# Patient Record
(demographics unavailable — no encounter records)

---

## 2024-11-01 NOTE — HISTORY OF PRESENT ILLNESS
[Cystocele (Obstetric)] : no [Vaginal Wall Prolapse] : no [Rectal Prolapse] : no [Unable To Restrain Bowel Movement] : no [Urinary Tract Infection] : no [Urinary Frequency More Than Twice At Night (Nocturia)] : no nocturia [Urinary Frequency] : no [Feelings Of Urinary Urgency] : no [Pain During Urination (Dysuria)] : no [Stool Visible Blood] : no [Incomplete Emptying Of Stool] : no [Pelvic Pain] : no [Vaginal Pain] : no [Rectal Pain] : no [] : no [de-identified] : none since her bulking [de-identified] : 10-15x/d [de-identified] : daily this week  [FreeTextEntry6] : Daily BM, soft, taking Linzess [FreeTextEntry1] : Flores is a premenopausal 40yo with stress incontinence, s/p urethral bulking on 10/16/24. Since her bulking, she has not had any episodes of stress incontinence. Denies incomplete emptying, straining to urinate, dysuria. She has noticed that she has leakage with an urge and daily urgency. She drinks 3-4L of water during the day because she likes water and will drink at bottle of water in a few minutes.  She has completed her childbearing and had a BTL.   PMH: None. Denies history of glaucoma  PSH: BTL, left shoulder surgery   Soc: Current smoker (vapes every other day, 1/4ppd x1y)  All: NKDA  Daily fluid intake: 6-8 bottles water + 1 can soda. No coffee/tea, juice. Last drink 1h prior to bed.

## 2024-11-01 NOTE — PHYSICAL EXAM
[Chaperone Present] : A chaperone was present in the examining room during all aspects of the physical examination [[id number]] : A chaperone was present during the pelvic exam. [Labia Majora] : were normal [Labia Minora] : were normal [Normal] : was normal [Normal Appearance] : general appearance was normal [No Bleeding] : there was no active vaginal bleeding [FreeTextEntry2] : Sindy [FreeTextEntry1] : General: Well, appearing. Alert and orientated. No acute distress HEENT: Normocephalic, atraumatic and extraocular muscles appear to be intact Neck: Full range of motion, no obvious lymphadenopathy, deformities, or masses noted Respiratory: Speaking in full sentences comfortably, normal work of breathing and no cough during visit Musculoskeletal: full range of motion  Extremities: No upper extremity edema noted Skin: no obvious rash or skin lesions Neuro: Orientated X 3, speech is fluent, normal rate Psych: Normal mood and affect

## 2024-11-01 NOTE — DISCUSSION/SUMMARY
[Reviewed Radiology Report(s)] : Radiology reports were reviewed. [Visit Time ___ Minutes] : [unfilled] minutes [FreeTextEntry1] : Flores is s/p urethral bulking for stress incontinence with good results. She is happy with the bulking. We reviewed her urinary symptoms and discussed possible etiologies including urinary tract infection and overactive bladder. Her PVR today was 75ml. Cath urine specimen was sent for urine culture to rule out urinary tract infection. We discussed management options for overactive bladder including observation, fluid and behavioral modifications, bladder training, physical therapy and medications including anticholinergics and beta 3 agonists. Specifically, we discussed avoidance of bladder irritants such as caffeine, carbonation, artificial sweeteners, alcohol, acidic foods/drinks (citrus, tomatoes, pineapple), spicy foods, and chocolate. We also discussed drinking 1-1.5L of plain water to maintain adequate hydration as urine that is too concentrated can also be irritating to the bladder. In addition, we reviewed drinking slowly since ingesting large quantities of fluid can result in rapid distension of the bladder, which can worsen urinary symptoms.   At this time, she will make the fluid and behavioral modifications for her urgency and incontinence. She had an outside MRI a few days after her bulking for abdominopelvic pain that showed air in the bladder. We discussed the air was likely from her recent procedure.   RTO 3-6m for OAB/UUI follow up.

## 2024-11-01 NOTE — PROCEDURE
[Straight Catheterization] : insertion of a straight catheter [Urinary Tract Infection] : a urinary tract infection [___ Fr Straight Tip] : a [unfilled] in [place] straight tip catheter [None] : there were no complications with the catheter insertion [Clear] : clear [Culture] : culture